# Patient Record
(demographics unavailable — no encounter records)

---

## 2025-02-19 NOTE — PLAN
[TextEntry] : FOOD ALLERGY: - Benadryl given in office - Recommended treatment: Benadryl 4mL Q6H PRN hives/itchiness and hydrocortisone 2.5% BID, avoiding mouth and eyes - Referral to Pediatric Allergy - Follow up if symptoms persist or worsen  I spent 31 minutes on face to face patient care, counseling and educating the patient/family/caregiver, ordering medications, and referring to external providers

## 2025-02-19 NOTE — HISTORY OF PRESENT ILLNESS
[de-identified] : dad reports pt w rash on face and chest starting today, +UO, appetite OK, dad denies fever, NVD, SOB, wheezing  [FreeTextEntry6] : Pt with reported HX of eczema presents with rash on face and body within 10 minutes of trying mozzarella cheese for the first time Itchy rash, scratching  Denies cough, vomiting, facial or lip swelling, or signs of resp distress at home Did not give any antihistamines  Denies recent illness Denies any other new exposures

## 2025-02-19 NOTE — HISTORY OF PRESENT ILLNESS
[de-identified] : dad reports pt w rash on face and chest starting today, +UO, appetite OK, dad denies fever, NVD, SOB, wheezing  [FreeTextEntry6] : Pt with reported HX of eczema presents with rash on face and body within 10 minutes of trying mozzarella cheese for the first time Itchy rash, scratching  Denies cough, vomiting, facial or lip swelling, or signs of resp distress at home Did not give any antihistamines  Denies recent illness Denies any other new exposures

## 2025-02-19 NOTE — PHYSICAL EXAM
[TextEntry] : General: alert and active in no apparent distress Eyes: conjunctiva clear, EOMI, PERRL Ears: TMs translucent bilaterally, normal landmarks noted, normal canals Nose: no rhinorrhea OP: no tonsillar enlargement/exudate, no lesions, no posterior pharyngeal erythema Neck: supple, no adenopathy Lungs: clear to auscultation bilaterally, good air exchange, no retractions, comfortable WOB CVS: Normal rate, regular rhythm, no murmur Abdomen: soft, nondistended, nontender, and no hepatosplenomegaly or masses, normoactive bowel sounds Skin: Erythematous blotches diffusely on face and all over body, excoriations on neck and right upper chest with small amount of bleeding; seen to be rubbing face against mom

## 2025-06-29 NOTE — DISCUSSION/SUMMARY
[Normal Growth] : growth [Normal Development] : development [None] : No known medical problems [No Elimination Concerns] : elimination [No Feeding Concerns] : feeding [No Skin Concerns] : skin [Normal Sleep Pattern] : sleep [Family Support] : family support [Establishing Routines] : establishing routines [Feeding and Appetite Changes] : feeding and appetite changes [Establishing A Dental Home] : establishing a dental home [Safety] : safety [Mother] : mother [Father] : father [] : The components of the vaccine(s) to be administered today are listed in the plan of care. The disease(s) for which the vaccine(s) are intended to prevent and the risks have been discussed with the caretaker.  The risks are also included in the appropriate vaccination information statements which have been provided to the patient's caregiver.  The caregiver has given consent to vaccinate. [FreeTextEntry1] : Transition to whole cow's milk. Continue table foods, 3 meals with 2-3 snacks per day. Incorporate up to 6 oz of water daily in a sippy cup. Brush teeth twice a day with soft toothbrush. Recommend visit to dentist. When in car, keep child in rear-facing car seats until age 2, or until  the maximum height and weight for seat is reached. Put baby to sleep in own crib with no loose or soft bedding. Lower crib mattress. Help baby to maintain consistent daily routines and sleep schedule. Recognize stranger and separation anxiety. Ensure home is safe since baby is increasingly mobile. Be within arm's reach of baby at all times. Use consistent, positive discipline. Avoid screen time. Read aloud to baby.   MMR, Prevnar and Hep A given  Lead WNL  Hgb low- CBC, iron studies ordered  Adequate interval growth  Passed [place]  Return at 15 months of age for WCC or sooner if any concerns

## 2025-06-29 NOTE — PHYSICAL EXAM
[Alert] : alert [Normocephalic] : normocephalic [Closed Anterior [place]] : closed anterior fontanelle [PERRL] : PERRL [Red Reflex] : red reflex bilateral [Normally Placed Ears] : normally placed ears [Auricles Well Formed] : auricles well formed [Clear Tympanic membranes] : clear tympanic membranes [Light reflex present] : light reflex present [Bony landmarks visible] : bony landmarks visible [Nares Patent] : nares patent [Palate Intact] : palate intact [Uvula Midline] : uvula midline [Tooth Eruption] : tooth eruption [Supple, full passive range of motion] : supple, full passive range of motion [Symmetric Chest Rise] : symmetric chest rise [Clear to Auscultation Bilaterally] : clear to auscultation bilaterally [Regular Rate and Rhythm] : regular rate and rhythm [S1, S2 present] : S1, S2 present [+2 Femoral Pulses] : (+) 2 femoral pulses [Soft] : soft [Bowel Sounds] : normoactive bowel sounds [Normal External Genitalia] : normal external genitalia [Normal Vaginal Introitus] : normal vaginal introitus [No Abnormal Lymph Nodes Palpated] : no abnormal lymph nodes palpated [Symmetric Abduction and Rotation of Hips] : symmetric abduction and rotation of hips [Straight] : straight [Cranial Nerves Grossly Intact] : cranial nerves grossly intact [Discharge] : no discharge [Palpable Masses] : no palpable masses [Murmurs] : no murmurs [Tender] : nontender [Distended] : nondistended [Hepatomegaly] : no hepatomegaly [Splenomegaly] : no splenomegaly [Clitoromegaly] : no clitoromegaly [Allis Sign] : negative Allis sign [de-identified] : mild eczema upper chest, moderate erythematous patches in creases of ankles 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

## 2025-06-29 NOTE — HISTORY OF PRESENT ILLNESS
[Parents] : parents [Breast milk] : breast milk [Fruit] : fruit [Vegetables] : vegetables [Meat] : meat [Finger food] : finger food [Vitamin ___] : Patient takes [unfilled] vitamin daily [Normal] : Normal [Vitamin] : Primary Fluoride Source: Vitamin [Playtime] : Playtime  [Water heater temperature set at <120 degrees F] : Water heater temperature set at <120 degrees F [Car seat in back seat] : Car seat in back seat [Smoke Detectors] : Smoke detectors [Carbon Monoxide Detectors] : Carbon monoxide detectors [No] : Not at  exposure [NO] : No [Exposure to electronic nicotine delivery system] : No exposure to electronic nicotine delivery system [At risk for exposure to TB] : Not at risk for exposure to Tuberculosis [FreeTextEntry7] : 12 month WCC- doing well Followed by endocrinology, 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

## 2025-06-29 NOTE — HISTORY OF PRESENT ILLNESS
[Parents] : parents [Breast milk] : breast milk [Fruit] : fruit [Vegetables] : vegetables [Meat] : meat [Finger food] : finger food [Vitamin ___] : Patient takes [unfilled] vitamin daily [Normal] : Normal [Vitamin] : Primary Fluoride Source: Vitamin [Playtime] : Playtime  [Water heater temperature set at <120 degrees F] : Water heater temperature set at <120 degrees F [Car seat in back seat] : Car seat in back seat [Smoke Detectors] : Smoke detectors [Carbon Monoxide Detectors] : Carbon monoxide detectors [No] : Not at  exposure [NO] : No [Exposure to electronic nicotine delivery system] : No exposure to electronic nicotine delivery system [At risk for exposure to TB] : Not at risk for exposure to Tuberculosis [FreeTextEntry7] : 12 month WCC- doing well Followed by endocrinology, 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

## 2025-06-29 NOTE — DEVELOPMENTAL MILESTONES
[Normal Development] : Normal Development [None] : none [Looks for hidden objects] : looks for hidden objects [Imitates new gestures] : imitates new gestures [Says "Dad" or "Mom" with meaning] : says "Dad" or "Mom" with meaning [Follows a verbal command that] : follows a verbal command that includes a gesture [Stands without support] : stands without support [Drops object in a cup] : drops object in a cup [Picks up small object with 2 finger] : picks up small object with 2 finger pincer grasp [Picks up food and eats it] : picks up food and eats it [FreeTextEntry1] : [place] Developmental reviewed - meeting all milestones, cruising along furniture and walking with push toys

## 2025-06-29 NOTE — PHYSICAL EXAM
[Alert] : alert [Normocephalic] : normocephalic [Closed Anterior [place]] : closed anterior fontanelle [Red Reflex] : red reflex bilateral [PERRL] : PERRL [Normally Placed Ears] : normally placed ears [Auricles Well Formed] : auricles well formed [Clear Tympanic membranes] : clear tympanic membranes [Light reflex present] : light reflex present [Bony landmarks visible] : bony landmarks visible [Nares Patent] : nares patent [Palate Intact] : palate intact [Uvula Midline] : uvula midline [Tooth Eruption] : tooth eruption [Supple, full passive range of motion] : supple, full passive range of motion [Symmetric Chest Rise] : symmetric chest rise [Clear to Auscultation Bilaterally] : clear to auscultation bilaterally [Regular Rate and Rhythm] : regular rate and rhythm [S1, S2 present] : S1, S2 present [+2 Femoral Pulses] : (+) 2 femoral pulses [Soft] : soft [Bowel Sounds] : normoactive bowel sounds [Normal External Genitalia] : normal external genitalia [Normal Vaginal Introitus] : normal vaginal introitus [No Abnormal Lymph Nodes Palpated] : no abnormal lymph nodes palpated [Symmetric Abduction and Rotation of Hips] : symmetric abduction and rotation of hips [Straight] : straight [Cranial Nerves Grossly Intact] : cranial nerves grossly intact [Discharge] : no discharge [Palpable Masses] : no palpable masses [Murmurs] : no murmurs [Tender] : nontender [Distended] : nondistended [Hepatomegaly] : no hepatomegaly [Splenomegaly] : no splenomegaly [Clitoromegaly] : no clitoromegaly [Allis Sign] : negative Allis sign [de-identified] : mild eczema upper chest, moderate erythematous patches in creases of ankles 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